# Patient Record
Sex: MALE | Race: WHITE | URBAN - METROPOLITAN AREA
[De-identification: names, ages, dates, MRNs, and addresses within clinical notes are randomized per-mention and may not be internally consistent; named-entity substitution may affect disease eponyms.]

---

## 2018-01-17 NOTE — PROGRESS NOTES
"  SUBJECTIVE:                                                    Dante Schaefer is a 28 year old male who presents to clinic today for the following health issues:      HPI    Concern - Throat pain   Onset: 3 weeks     Description:   Was eating fish and ate a bone. Spit it out. Throat pain intermittently ever since     Intensity: mild, moderate    Progression of Symptoms:  intermittent     States symptoms have been improving has not had symptoms in 4 days. Denies bleeding, vomiting, dark stool, or difficulty swallowing    Problem list and histories reviewed & adjusted, as indicated.  Additional history: as documented    BP Readings from Last 3 Encounters:   01/19/18 114/70   07/23/09 92/78   08/13/07 134/60    Wt Readings from Last 3 Encounters:   01/19/18 171 lb 3.2 oz (77.7 kg)   07/23/09 178 lb (80.7 kg) (83 %)*   08/13/07 191 lb (86.6 kg) (95 %)*     * Growth percentiles are based on [facility] 2-20 Years data.        Labs reviewed in EPIC      ROS:  Constitutional, HEENT, cardiovascular, pulmonary, gi and gu systems are negative, except as otherwise noted.      OBJECTIVE:   /70 (BP Location: Left arm, Patient Position: Chair, Cuff Size: Adult Regular)  Pulse 66  Temp 98.3  F (36.8  C) (Oral)  Resp 16  Ht 5' 7.91\" (1.725 m)  Wt 171 lb 3.2 oz (77.7 kg)  BMI 26.1 kg/m2  Body mass index is 26.1 kg/(m^2).  GENERAL: healthy, alert and no distress  EYES: Eyes grossly normal to inspection, PERRL and conjunctivae and sclerae normal  HENT: normal cephalic/atraumatic, ear canals and TM's normal, nose and mouth without ulcers or lesions, oropharynx clear, oral mucous membranes moist and slight erythema near left tonsillar area.   NECK: no adenopathy, no asymmetry, masses, or scars and thyroid normal to palpation  CV: regular rate and rhythm, normal S1 S2, no S3 or S4, no murmur, click or rub, no peripheral edema and peripheral pulses strong  MS: no gross musculoskeletal defects noted, no edema    ASSESSMENT/PLAN:     1. " Lesion of throat  Discussed that symptoms are likely caused by abrasion and will heal without treatment. Discussed avoiding hard scratchy foods and acidic food/drinks. Continue to drink a lot of water to keep area moist. If symptoms do not improve or worsen will need evaluation by ENT.       Patient Instructions   Please call if symptoms do not improve over next 2-3 weeks or sooner if symptoms worsen for referral to ENT specialist for further evaluation.     Thank you  Elda Cole [facility]

## 2018-01-19 ENCOUNTER — OFFICE VISIT (OUTPATIENT)
Dept: FAMILY MEDICINE | Facility: OTHER | Age: 28
End: 2018-01-19
Payer: COMMERCIAL

## 2018-01-19 VITALS
WEIGHT: 171.2 LBS | TEMPERATURE: 98.3 F | DIASTOLIC BLOOD PRESSURE: 70 MMHG | HEIGHT: 68 IN | HEART RATE: 66 BPM | BODY MASS INDEX: 25.94 KG/M2 | SYSTOLIC BLOOD PRESSURE: 114 MMHG | RESPIRATION RATE: 16 BRPM

## 2018-01-19 DIAGNOSIS — J39.2 LESION OF THROAT: Primary | ICD-10-CM

## 2018-01-19 PROCEDURE — 99213 OFFICE O/P EST LOW 20 MIN: CPT | Performed by: NURSE PRACTITIONER

## 2018-01-19 NOTE — MR AVS SNAPSHOT
"              After Visit Summary   1/19/2018    Dante Schaefer    MRN: [id number]           Patient Information     Date Of Birth          1990        Visit Information        Provider Department      1/19/2018 3:20 PM Elda Cole APRN CNP [facility]        Care Instructions    Please call if symptoms do not improve over next 2-3 weeks or sooner if symptoms worsen for referral to ENT specialist for further evaluation.     Thank you  Elda Cole CNP            Follow-ups after your visit        Who to contact     If you have questions or need follow up information about today's clinic visit or your schedule please contact [facility] directly at [phone].  Normal or non-critical lab and imaging results will be communicated to you by MyChart, letter or phone within 4 business days after the clinic has received the results. If you do not hear from us within 7 days, please contact the clinic through MyChart or phone. If you have a critical or abnormal lab result, we will notify you by phone as soon as possible.  Submit refill requests through [employer] or call your pharmacy and they will forward the refill request to us. Please allow 3 business days for your refill to be completed.          Additional Information About Your Visit        Care EveryWhere ID     This is your Care EveryWhere ID. This could be used by other organizations to access your [place] medical records  [id number]        Your Vitals Were     Pulse Temperature Respirations Height BMI (Body Mass Index)       66 98.3  F (36.8  C) (Oral) 16 5' 7.91\" (1.725 m) 26.1 kg/m2        Blood Pressure from Last 3 Encounters:   01/19/18 114/70   07/23/09 92/78   08/13/07 134/60    Weight from Last 3 Encounters:   01/19/18 171 lb 3.2 oz (77.7 kg)   07/23/09 178 lb (80.7 kg) (83 %)*   08/13/07 191 lb (86.6 kg) (95 %)*     * Growth percentiles are based on CDC 2-20 Years data.              Today, you had the " following     No orders found for display       Primary Care Provider Office Phone # Fax #    Lennox King PA-C [phone] [address] [place] [address]        Equal Access to Services     JOSEPH VEGAS : Hadii dmitry ku brendono Soemreali, waaxda luqadaha, qaybta kaalmada adeegyada, waxsally idiin mosesn vietosei mansfield laorquidea mtz. So [facility] [phone].    ATENCIÓN: Si habla español, tiene a trevino disposición servicios gratuitos de asistencia lingüística. Llame al [phone].    We comply with applicable federal civil rights laws and [place] laws. We do not discriminate on the basis of race, color, national origin, age, disability, sex, sexual orientation, or gender identity.            Thank you!     Thank you for choosing [facility]  for your care. Our goal is always to provide you with excellent care. Hearing back from our patients is one way we can continue to improve our services. Please take a few minutes to complete the written survey that you may receive in the mail after your visit with us. Thank you!             Your Updated Medication List - Protect others around you: Learn how to safely use, store and throw away your medicines at www.disposemymeds.org.          This list is accurate as of: 1/19/18  3:36 PM.  Always use your most recent med list.                   Brand Name Dispense Instructions for use Diagnosis    NO ACTIVE MEDICATIONS     0    .

## 2018-01-19 NOTE — NURSING NOTE
"Chief Complaint   Patient presents with     neck soreness     Panel Management     height, ellis, mychart, tdap, flu       Initial /70 (BP Location: Left arm, Patient Position: Chair, Cuff Size: Adult Regular)  Pulse 66  Temp 98.3  F (36.8  C) (Oral)  Resp 16  Ht 5' 7.91\" (1.725 m)  Wt 171 lb 3.2 oz (77.7 kg)  BMI 26.1 kg/m2 Estimated body mass index is 26.1 kg/(m^2) as calculated from the following:    Height as of this encounter: 5' 7.91\" (1.725 m).    Weight as of this encounter: 171 lb 3.2 oz (77.7 kg).  Medication Reconciliation: complete    "

## 2018-01-19 NOTE — PATIENT INSTRUCTIONS
Please call if symptoms do not improve over next 2-3 weeks or sooner if symptoms worsen for referral to ENT specialist for further evaluation.     Thank you  Elda Cole CNP

## 2018-02-14 ENCOUNTER — OFFICE VISIT (OUTPATIENT)
Dept: OTOLARYNGOLOGY | Facility: CLINIC | Age: 28
End: 2018-02-14
Payer: COMMERCIAL

## 2018-02-14 VITALS
BODY MASS INDEX: 26.37 KG/M2 | TEMPERATURE: 99 F | HEIGHT: 68 IN | HEART RATE: 63 BPM | SYSTOLIC BLOOD PRESSURE: 127 MMHG | OXYGEN SATURATION: 100 % | WEIGHT: 174 LBS | DIASTOLIC BLOOD PRESSURE: 83 MMHG | RESPIRATION RATE: 16 BRPM

## 2018-02-14 DIAGNOSIS — R09.A2 GLOBUS SENSATION: Primary | ICD-10-CM

## 2018-02-14 PROCEDURE — 31575 DIAGNOSTIC LARYNGOSCOPY: CPT | Performed by: OTOLARYNGOLOGY

## 2018-02-14 PROCEDURE — 99244 OFF/OP CNSLTJ NEW/EST MOD 40: CPT | Mod: 25 | Performed by: OTOLARYNGOLOGY

## 2018-02-14 ASSESSMENT — PAIN SCALES - GENERAL: PAINLEVEL: NO PAIN (0)
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
## 2018-02-14 NOTE — MR AVS SNAPSHOT
After Visit Summary   2/14/2018    Dante Schaefer    MRN: [id number]           Patient Information     Date Of Birth          1990        Visit Information        Provider Department      2/14/2018 2:45 PM Randy Phillips MD [facility]        Today's Diagnoses     Globus sensation    -  1      Care Instructions    General Scheduling Information  To schedule your CT/MRI scan, please contact [place] at [phone] [address] [place] [address]    To schedule your Surgery, please contact our Specialty Schedulers at [phone]    ENT Clinic Locations Clinic Hours Telephone Number     [facility]  [address]  [place] [address]   Tuesday:       8:00am -- 4:00pm    Wednesday:  8:00am - 4:00pm   To schedule an appointment with   Dr. Phillips,   please contact our   Specialty Scheduling Department at:     [phone]       [facility]  [address]   [place] [address]   Friday:          8:00am - 4:00pm         Urgent Care Locations Clinic Hours Telephone Numbers     [facility] [place]  [address] [place] [address]     Monday-Friday:     11:00pm - 9:00pm    Saturday-Sunday:  9:00am - 5:00pm   [phone]     [facility]  [address]   [place] [address]     Monday-Friday:      5:00pm - 9:00pm     Saturday-Sunday:  9:00am - 5:00pm   [phone]                 Follow-ups after your visit        Who to contact     If you have questions or need follow up information about today's clinic visit or your schedule please contact [facility] directly at [phone].  Normal or non-critical lab and imaging results will be communicated to you by MyChart, letter or phone within 4 business days after the clinic has received the results. If you do not hear from us within 7 days, please contact the clinic through MyChart or phone. If you have a critical or abnormal lab result, we will notify you by phone as  "soon as possible.  Submit refill requests through [employer] or call your pharmacy and they will forward the refill request to us. Please allow 3 business days for your refill to be completed.          Additional Information About Your Visit        Care EveryWhere ID     This is your Care EveryWhere ID. This could be used by other organizations to access your [place] medical records  [id number]        Your Vitals Were     Pulse Temperature Respirations Height Pulse Oximetry BMI (Body Mass Index)    63 99  F (37.2  C) (Oral) 16 1.725 m (5' 7.91\") 100% 26.53 kg/m2       Blood Pressure from Last 3 Encounters:   02/14/18 127/83   01/19/18 114/70   07/23/09 92/78    Weight from Last 3 Encounters:   02/14/18 78.9 kg (174 lb)   01/19/18 77.7 kg (171 lb 3.2 oz)   07/23/09 80.7 kg (178 lb) (83 %)*     * Growth percentiles are based on [facility] 2-20 Years data.              We Performed the Following     Laryngoscopy, Fiber        Primary Care Provider Office Phone # Fax #    Lennox King PA-C [phone] [address] [place] [address]        Equal Access to Services     JOSEPH VEGAS : Hadii aad ku hadasho Soemreali, waaxda luqadaha, qaybta kaalmada adeegyada, dre mtz. So [facility] [phone].    ATENCIÓN: Si habla español, tiene a trevino disposición servicios gratuitos de asistencia lingüística. Llame al [phone].    We comply with applicable federal civil rights laws and [place] laws. We do not discriminate on the basis of race, color, national origin, age, disability, sex, sexual orientation, or gender identity.            Thank you!     Thank you for choosing [facility]  for your care. Our goal is always to provide you with excellent care. Hearing back from our patients is one way we can continue to improve our services. Please take a few minutes to complete the written survey that you may receive in the mail after your visit with us. Thank you!   "           Your Updated Medication List - Protect others around you: Learn how to safely use, store and throw away your medicines at www.disposemymeds.org.          This list is accurate as of 2/14/18  4:41 PM.  Always use your most recent med list.                   Brand Name Dispense Instructions for use Diagnosis    NO ACTIVE MEDICATIONS     0    .

## 2018-02-14 NOTE — PATIENT INSTRUCTIONS
General Scheduling Information  To schedule your CT/MRI scan, please contact [place] [facility] at [phone]   [address] [place] [address]    To schedule your Surgery, please contact our Specialty Schedulers at [phone]    ENT Clinic Locations Clinic Hours Telephone Number     [facility]  [address] [place] [address]   Tuesday:       8:00am -- 4:00pm    Wednesday:  8:00am - 4:00pm   To schedule an appointment with   Dr. Phillips,   please contact our   Specialty Scheduling Department at:     [phone]       [facility]  [address] [place] [address]   Friday:          8:00am - 4:00pm         Urgent Care Locations Clinic Hours Telephone Numbers     [facility]  [address] [place] [address]     Monday-Friday:     11:00pm - 9:00pm    Saturday-Sunday:  9:00am - 5:00pm   [phone]     [facility]  [address] [place] [address]     Monday-Friday:      5:00pm - 9:00pm     Saturday-Sunday:  9:00am - 5:00pm   [phone]
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
## 2019-01-31 ENCOUNTER — TELEPHONE (OUTPATIENT)
Dept: FAMILY MEDICINE | Facility: OTHER | Age: 29
End: 2019-01-31

## 2019-01-31 NOTE — TELEPHONE ENCOUNTER
1/31/2019    Call Regarding Onboarding MVP OTHER    Attempt 1    Message on voicemail     Comments: NO DEP      Outreach   LR

## 2019-02-11 NOTE — TELEPHONE ENCOUNTER
2/11/2019    Call Regarding Onboarding: [facility] Other    Attempt 2    Message on voicemail     Comments: No Dep      Outreach   ADONIS

## 2019-02-15 NOTE — TELEPHONE ENCOUNTER
2/15/2019    Call Regarding Onboarding: [facility] Other    Attempt 3    Message on voicemail     Comments: No Dep      Outreach   ADONIS